# Patient Record
Sex: MALE | Race: WHITE | Employment: FULL TIME | URBAN - NONMETROPOLITAN AREA
[De-identification: names, ages, dates, MRNs, and addresses within clinical notes are randomized per-mention and may not be internally consistent; named-entity substitution may affect disease eponyms.]

---
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
## 2018-01-22 ENCOUNTER — TELEPHONE (OUTPATIENT)
Dept: OPTOMETRY | Age: 18
End: 2018-01-22

## 2018-12-05 ENCOUNTER — OFFICE VISIT (OUTPATIENT)
Dept: OPTOMETRY | Age: 18
End: 2018-12-05
Payer: COMMERCIAL

## 2018-12-05 DIAGNOSIS — H52.4 MYOPIA OF BOTH EYES WITH ASTIGMATISM AND PRESBYOPIA: Primary | ICD-10-CM

## 2018-12-05 DIAGNOSIS — H52.13 MYOPIA OF BOTH EYES WITH ASTIGMATISM AND PRESBYOPIA: Primary | ICD-10-CM

## 2018-12-05 DIAGNOSIS — H52.203 MYOPIA OF BOTH EYES WITH ASTIGMATISM AND PRESBYOPIA: Primary | ICD-10-CM

## 2018-12-05 PROCEDURE — 92014 COMPRE OPH EXAM EST PT 1/>: CPT | Performed by: OPTOMETRIST

## 2018-12-05 RX ORDER — ESCITALOPRAM OXALATE 10 MG/1
10 TABLET ORAL
COMMUNITY
Start: 2018-04-05

## 2018-12-05 ASSESSMENT — REFRACTION_MANIFEST
OS_AXIS: 157
OS_CYLINDER: -2.50
OD_SPHERE: -4.75
OD_AXIS: 026
OS_SPHERE: -4.50
OD_CYLINDER: -3.00

## 2018-12-05 ASSESSMENT — REFRACTION_WEARINGRX
OD_SPHERE: -4.50
OS_AXIS: 152
OD_AXIS: 027
OS_CYLINDER: -2.50
SPECS_TYPE: SVL: NOT FILLED
OS_SPHERE: -4.75
OD_CYLINDER: -3.25

## 2018-12-05 ASSESSMENT — KERATOMETRY
OS_K1POWER_DIOPTERS: 42.75
OS_AXISANGLE2_DEGREES: 154
OS_K2POWER_DIOPTERS: 45.00
OS_AXISANGLE_DEGREES: 064

## 2018-12-05 ASSESSMENT — REFRACTION_CURRENTRX
OS_AXIS: 160
OS_BRAND: VISTAKON: ACUVUE OASYS FOR ASTIGMATISM
OD_CYLINDER: -2.75
OS_SPHERE: -4.50
OD_BRAND: VISTAKON: ACUVUE OASYS FOR ASTIGMATISM
OS_CYLINDER: -2.25
OD_SPHERE: -4.25
OD_AXIS: 030

## 2018-12-05 ASSESSMENT — TONOMETRY
OD_IOP_MMHG: 14
IOP_METHOD: NON-CONTACT AIR PUFF
OS_IOP_MMHG: 13

## 2018-12-05 ASSESSMENT — SLIT LAMP EXAM - LIDS
COMMENTS: NORMAL
COMMENTS: NORMAL

## 2018-12-05 ASSESSMENT — VISUAL ACUITY
OS_CC: 20/20
METHOD: SNELLEN - LINEAR
CORRECTION_TYPE: CONTACTS

## 2018-12-05 NOTE — PROGRESS NOTES
Right [place]: Acuvue Oasys for Astigmatism -4.25 -2.75 030    Left [place]: Acuvue Oasys for Astigmatism -4.50 -2.25 160                FINAL   Final Contact Lens Rx       Brand Sphere Cylinder Axis    Right [place]: Acuvue Oasys for Astigmatism -4.50 -2.75 030    Left [place]: Acuvue Oasys for Astigmatism -4.50 -2.25 160    Expiration Date:  12/6/2019    Wearing Schedule:  Daily wear    Final            Plan:     1.  Myopia of both eyes with astigmatism and presbyopia             Patient Instructions   New trials were given (-1.75 astigmatism in the right eye)     Return in about 1 year (around 12/5/2019) for complete eye exam.

## 2020-10-14 ENCOUNTER — OFFICE VISIT (OUTPATIENT)
Dept: OPTOMETRY | Age: 20
End: 2020-10-14
Payer: COMMERCIAL

## 2020-10-14 PROCEDURE — 92014 COMPRE OPH EXAM EST PT 1/>: CPT | Performed by: OPTOMETRIST

## 2020-10-14 ASSESSMENT — REFRACTION_WEARINGRX
OD_CYLINDER: -3.00
OD_SPHERE: -4.75
SPECS_TYPE: SVL: NOT FILLED
OS_AXIS: 157
OS_SPHERE: -4.50
OS_CYLINDER: -2.50
OD_AXIS: 026

## 2020-10-14 ASSESSMENT — VISUAL ACUITY
CORRECTION_TYPE: CONTACTS
OD_CC+: -1
OS_CC: 20/25
METHOD: SNELLEN - LINEAR

## 2020-10-14 ASSESSMENT — REFRACTION_MANIFEST
OS_SPHERE: -4.75
OD_SPHERE: -4.75
OS_AXIS: 155
OD_CYLINDER: -3.00
OD_AXIS: 026
OS_CYLINDER: -2.50

## 2020-10-14 ASSESSMENT — KERATOMETRY
OS_K2POWER_DIOPTERS: 45.25
METHOD_AUTO_MANUAL: AUTOMATED
OS_AXISANGLE_DEGREES: 067
OS_K1POWER_DIOPTERS: 43.00
OS_AXISANGLE2_DEGREES: 157

## 2020-10-14 ASSESSMENT — SLIT LAMP EXAM - LIDS
COMMENTS: NORMAL
COMMENTS: NORMAL

## 2020-10-14 ASSESSMENT — TONOMETRY
IOP_METHOD: NON-CONTACT AIR PUFF
OD_IOP_MMHG: 15
OS_IOP_MMHG: 19

## 2020-10-14 ASSESSMENT — REFRACTION_CURRENTRX
OS_CYLINDER: -2.25
OS_AXIS: 160
OD_AXIS: 030
OS_SPHERE: -4.50
OD_BRAND: VISTAKON: ACUVUE OASYS FOR ASTIGMATISM
OD_SPHERE: -4.50
OS_BRAND: VISTAKON: ACUVUE OASYS FOR ASTIGMATISM
OD_CYLINDER: -2.75

## 2020-10-14 NOTE — PROGRESS NOTES
Poppy Mehta presents today for   Chief Complaint   Patient presents with    Blurred Vision    Vision Exam   .    HPI     Blurred Vision     In both eyes. Vision is blurred. Context:  distance vision. Comments     Last Vision Exam: 12/5/2018 AW  Last Ophthalmology Exam: n/a  Last Filled Glasses Rx: ?   Insurance: [employer]  Update: Contacts  Ran out of contacts  Distance is getting more blurry  Wearing the last pair   Vision seems ok until checking today  Lenses are past due                        Family History   Problem Relation Age of Onset    Allergies Mother     Hypertension Mother         gestational high blood pressure     Diabetes Maternal Grandfather     Glaucoma Neg Hx     Cataracts Neg Hx        Social History     Socioeconomic History    Marital status: Single     Spouse name: None    Number of children: None    Years of education: None    Highest education level: None   Occupational History    None   Social Needs    Financial resource strain: None    Food insecurity     Worry: None     Inability: None    Transportation needs     Medical: None     Non-medical: None   Tobacco Use    Smoking status: Never Smoker    Smokeless tobacco: Never Used   Substance and Sexual Activity    Alcohol use: None    Drug use: None    Sexual activity: None   Lifestyle    Physical activity     Days per week: None     Minutes per session: None    Stress: None   Relationships    Social connections     Talks on phone: None     Gets together: None     Attends Sikh service: None     Active member of club or organization: None     Attends meetings of clubs or organizations: None     Relationship status: None    Intimate partner violence     Fear of current or ex partner: None     Emotionally abused: None     Physically abused: None     Forced sexual activity: None   Other Topics Concern    None   Social History Narrative    None     Past Medical History:   Diagnosis Date    History of anal fissures     from functional constipation    Myopia with astigmatism        Neuro/Psych     Neuro/Psych     Oriented x3:  Yes    Mood/Affect:  Normal                Main Ophthalmology Exam     External Exam       Right Left    External Normal Normal          Slit Lamp Exam       Right Left    Lids/Lashes Normal Normal    Conjunctiva/Sclera White and quiet White and quiet    Cornea Clear Clear    Anterior Chamber Deep and quiet Deep and quiet    Iris Round and reactive Round and reactive    Lens Clear Clear    Vitreous Normal Normal          Fundus Exam       Right Left    Disc Normal Normal    C/D Ratio 0.2 0.2    Macula Normal Normal    Vessels Normal Normal                  Tonometry     Tonometry (Non-contact air puff, 9:17 AM)       Right Left    Pressure 15 19   IOP.0             16.2  CH:  9.6          8.6  WS: 5.5          6.0                     Visual Acuity (Snellen - Linear)       Right Left    Dist cc 20/50 -1 20/25    Correction:  Contacts        Keratometry     Keratometry (Automated)       K1 Axis K2 Axis    Right        Left 43.00 157 45.25 067                Ophthalmology Exam     Wearing Rx       Sphere Cylinder Axis    Right -4.75 -3.00 026    Left -4.50 -2.50 157    Age:  1yr    Type:  SVL: not filled                Manifest Refraction     Manifest Refraction       Sphere Cylinder Axis Dist [place]    Right -4.75 -3.00 026 20/20    Left -4.75 -2.50 155 20/20          Manifest Refraction #2 (Auto)       Sphere Cylinder [place] Dist VA    Right -5.00 -3.50 026     Left -5.25 -3.25 155                  Final Rx       Sphere Cylinder Axis    Right -4.75 -3.00 026    Left -4.75 -2.50 155    Type:  SVL    Expiration Date:  10/15/2022           Contact Lens Current Rx     Current Contact Lens Rx (Ordered)       Brand Sphere Cylinder Axis    Right [place]: Acuvue Oasys for Astigmatism -4.50 -2.75 030    Left [place]: Acuvue Oasys for Astigmatism -4.50 -2.25 160              Final   Final Contact Lens Rx Brand Sphere Cylinder Axis    Right [place]: Acuvue Oasys for Astigmatism -4.50 -2.75 030    Left [place]: Acuvue Oasys for Astigmatism -4.50 -2.25 160    Expiration Date:  10/15/2021    Wearing Schedule:  Daily wear           Plan:     1. Myopia of both eyes with astigmatism    2.  Blurred vision, bilateral             Patient Instructions   New glasses rx printed    New contact lenses will be ordered      Return in about 1 year (around 10/14/2021) for complete eye exam.

## 2021-06-17 ENCOUNTER — TELEPHONE (OUTPATIENT)
Dept: OPTOMETRY | Age: 21
End: 2021-06-17

## 2021-06-17 NOTE — TELEPHONE ENCOUNTER
Patient would like to order 1 box of contacts for each eye. Total of 2 boxes.      Contact Lens Prescription (10/14/2020)      Brand Sphere Cylinder Axis   Right [place]: Acuvue Oasys for Astigmatism -4.50 -2.75 030   Left [place]: Acuvue Oasys for Astigmatism -4.50 -2.25 160   Expiration Date: 10/15/2021   Wearing Schedule: Daily wear

## 2024-10-07 ENCOUNTER — OFFICE VISIT (OUTPATIENT)
Dept: PRIMARY CARE CLINIC | Age: 24
End: 2024-10-07
Payer: COMMERCIAL

## 2024-10-07 VITALS
SYSTOLIC BLOOD PRESSURE: 120 MMHG | OXYGEN SATURATION: 98 % | HEART RATE: 74 BPM | WEIGHT: 168 LBS | TEMPERATURE: 98.3 F | DIASTOLIC BLOOD PRESSURE: 74 MMHG

## 2024-10-07 DIAGNOSIS — L23.9 ALLERGIC DERMATITIS: Primary | ICD-10-CM

## 2024-10-07 DIAGNOSIS — R19.7 DIARRHEA, UNSPECIFIED TYPE: ICD-10-CM

## 2024-10-07 PROCEDURE — 99203 OFFICE O/P NEW LOW 30 MIN: CPT | Performed by: NURSE PRACTITIONER

## 2024-10-07 RX ORDER — PREDNISONE 20 MG/1
20 TABLET ORAL 2 TIMES DAILY
Qty: 10 TABLET | Refills: 0 | Status: SHIPPED | OUTPATIENT
Start: 2024-10-07 | End: 2024-10-12

## 2024-10-07 ASSESSMENT — ENCOUNTER SYMPTOMS
DIARRHEA: 1
SHORTNESS OF BREATH: 0
VOMITING: 0

## 2024-10-07 ASSESSMENT — PATIENT HEALTH QUESTIONNAIRE - PHQ9
SUM OF ALL RESPONSES TO PHQ QUESTIONS 1-9: 0
2. FEELING DOWN, DEPRESSED OR HOPELESS: NOT AT ALL
SUM OF ALL RESPONSES TO PHQ QUESTIONS 1-9: 0
1. LITTLE INTEREST OR PLEASURE IN DOING THINGS: NOT AT ALL
SUM OF ALL RESPONSES TO PHQ QUESTIONS 1-9: 0
SUM OF ALL RESPONSES TO PHQ QUESTIONS 1-9: 0
SUM OF ALL RESPONSES TO PHQ9 QUESTIONS 1 & 2: 0

## 2024-10-07 NOTE — PROGRESS NOTES
Subjective:      Patient ID: Manoj Crane is a 23 y.o. male coming in for   Chief Complaint   Patient presents with    Rash     Trunk of body, now has diarrhea         Rash  This is a new problem. Episode onset: x4days. The problem has been gradually worsening since onset. Location: started on abd and now spreading upwards and on shoulders. Rash characteristics: small papular, erythematous. He was exposed to nothing. Associated symptoms include diarrhea. Pertinent negatives include no fever, shortness of breath or vomiting. (Reports diarrhea x 5-6 times this morning. Did also have some nausea, but no vomiting. Did eat out last night. ) Treatments tried: aloe cream. The treatment provided no relief (did help for approx an hr).       Review of Systems   Constitutional:  Negative for fever.   Respiratory:  Negative for shortness of breath.    Gastrointestinal:  Positive for diarrhea. Negative for vomiting.   Skin:  Positive for rash.        Objective:/74 (Site: Left Upper Arm, Position: Sitting, Cuff Size: Large Adult)   Pulse 74   Temp 98.3 °F (36.8 °C) (Tympanic)   Wt 76.2 kg (168 lb)   SpO2 98%      Physical Exam  Vitals and nursing note reviewed.   Constitutional:       General: He is not in acute distress.     Appearance: Normal appearance. He is not ill-appearing.   HENT:      Head: Normocephalic.   Cardiovascular:      Rate and Rhythm: Normal rate and regular rhythm.      Heart sounds: Normal heart sounds.   Pulmonary:      Effort: Pulmonary effort is normal.      Breath sounds: Normal breath sounds.   Abdominal:      General: Bowel sounds are normal. There is no distension.      Palpations: Abdomen is soft.      Tenderness: There is no abdominal tenderness.   Musculoskeletal:      Right lower leg: No edema.      Left lower leg: No edema.   Skin:     General: Skin is warm and dry.      Findings: Rash present. Rash is papular.   Neurological:      General: No focal deficit present.      Mental Status: